# Patient Record
Sex: FEMALE | URBAN - METROPOLITAN AREA
[De-identification: names, ages, dates, MRNs, and addresses within clinical notes are randomized per-mention and may not be internally consistent; named-entity substitution may affect disease eponyms.]

---

## 2017-10-04 ENCOUNTER — OFFICE VISIT (OUTPATIENT)
Dept: OBGYN | Facility: CLINIC | Age: 32
End: 2017-10-04
Payer: COMMERCIAL

## 2017-10-04 VITALS
SYSTOLIC BLOOD PRESSURE: 121 MMHG | DIASTOLIC BLOOD PRESSURE: 73 MMHG | HEART RATE: 76 BPM | WEIGHT: 154.6 LBS | BODY MASS INDEX: 26.4 KG/M2 | HEIGHT: 64 IN

## 2017-10-04 DIAGNOSIS — Z30.09 ENCOUNTER FOR OTHER GENERAL COUNSELING OR ADVICE ON CONTRACEPTION: Primary | ICD-10-CM

## 2017-10-04 DIAGNOSIS — Z11.3 SCREEN FOR STD (SEXUALLY TRANSMITTED DISEASE): ICD-10-CM

## 2017-10-04 PROCEDURE — 99212 OFFICE O/P EST SF 10 MIN: CPT | Mod: ZF

## 2017-10-04 PROCEDURE — 40000809 ZZH STATISTIC NO DOCUMENTATION TO SUPPORT CHARGE

## 2017-10-04 PROCEDURE — 87491 CHLMYD TRACH DNA AMP PROBE: CPT | Performed by: OBSTETRICS & GYNECOLOGY

## 2017-10-04 PROCEDURE — 87591 N.GONORRHOEAE DNA AMP PROB: CPT | Performed by: OBSTETRICS & GYNECOLOGY

## 2017-10-04 RX ORDER — ACETAMINOPHEN AND CODEINE PHOSPHATE 120; 12 MG/5ML; MG/5ML
1 SOLUTION ORAL DAILY
Qty: 84 TABLET | Refills: 1 | Status: SHIPPED | OUTPATIENT
Start: 2017-10-04 | End: 2017-11-17

## 2017-10-04 NOTE — MR AVS SNAPSHOT
After Visit Summary   10/4/2017    Lucie Topete    MRN: [id number]           Patient Information     Date Of Birth          1985        Visit Information        Provider Department      10/4/2017 4:00 PM Maine Lara MD [facility]        Today's Diagnoses     Encounter for other general counseling or advice on contraception    -  1    Screen for STD (sexually transmitted disease)          Care Instructions    Make annual exam appointment in 3 months     If you desire IUD at that time, call ahead of time to get prescription for cytotec/misoprostol      new birth control pill prescription           Follow-ups after your visit        Follow-up notes from your care team     Return in about 3 months (around 1/4/2018) for Physical Exam.      Your next 10 appointments already scheduled     Jan 04, 2018  8:00 AM CST   Return Visit with Laury Man MD   [facility] ([facility])    [facility] [address]  [address]  [facility] [address] [phone]              Who to contact     Please call your clinic at [phone] to:    Ask questions about your health    Make or cancel appointments    Discuss your medicines    Learn about your test results    Speak to your doctor   If you have compliments or concerns about an experience at your clinic, or if you wish to file a complaint, please contact [facility] Patient Relations at [phone] or email us at [email or web address]         Additional Information About Your Visit        MyChart Information     [employer] gives you secure access to your electronic health record. If you see a primary care provider, you can also send messages to your care team and make appointments. If you have questions, please call your primary care clinic.  If you do not have a primary care provider, please call [phone] and they will  "assist you.      [employer] is an electronic gateway that provides easy, online access to your medical records. With [employer], you can request a clinic appointment, read your test results, renew a prescription or communicate with your care team.     To access your existing account, please contact your [facility] or call [phone] for assistance.        Care EveryWhere ID     This is your Care EveryWhere ID. This could be used by other organizations to access your [place] medical records  [id number]        Your Vitals Were     Pulse Height Last Period BMI (Body Mass Index)          76 1.626 m (5' 4\") 09/07/2017 (Exact Date) 26.54 kg/m2         Blood Pressure from Last 3 Encounters:   10/04/17 121/73   10/13/16 122/77   07/25/16 118/74    Weight from Last 3 Encounters:   10/04/17 70.1 kg (154 lb 9.6 oz)   10/13/16 73.4 kg (161 lb 14.4 oz)   07/25/16 75 kg (165 lb 6.4 oz)              We Performed the Following     CHLAMYDIA TRACHOMATIS PCR     NEISSERIA GONORRHOEA PCR          Today's Medication Changes          These changes are accurate as of: 10/4/17 11:59 PM.  If you have any questions, ask your nurse or doctor.               Start taking these medicines.        Dose/Directions    norethindrone 0.35 MG per tablet   Commonly known as:  MICRONOR   Used for:  Encounter for other general counseling or advice on contraception   Started by:  Maine Lara MD        Dose:  1 tablet   Take 1 tablet (0.35 mg) by mouth daily   Quantity:  84 tablet   Refills:  1            Where to get your medicines      These medications were sent to [facility]/pharmacy #[id number] [address] [address]     Phone:  [phone]     norethindrone 0.35 MG per tablet                Primary Care Provider    Physician No Ref-Primary       NO REF-PRIMARY PHYSICIAN        Equal Access to Services     PADMINI HURTADO AH: norbert Dela Cruz " karely nunobillhilaria newtonjourdan macias. So [facility] [phone].    ATENCIÓN: Si lavell ballard, tiene a melendez disposición servicios gratuitos de asistencia lingüística. Llame al [phone].    We comply with applicable federal civil rights laws and [place] laws. We do not discriminate on the basis of race, color, national origin, age, disability, sex, sexual orientation, or gender identity.            Thank you!     Thank you for choosing [facility]  for your care. Our goal is always to provide you with excellent care. Hearing back from our patients is one way we can continue to improve our services. Please take a few minutes to complete the written survey that you may receive in the mail after your visit with us. Thank you!             Your Updated Medication List - Protect others around you: Learn how to safely use, store and throw away your medicines at www.disposemymeds.org.          This list is accurate as of: 10/4/17 11:59 PM.  Always use your most recent med list.                   Brand Name Dispense Instructions for use Diagnosis    norethindrone 0.35 MG per tablet    MICRONOR    84 tablet    Take 1 tablet (0.35 mg) by mouth daily    Encounter for other general counseling or advice on contraception

## 2017-10-04 NOTE — PATIENT INSTRUCTIONS
Make annual exam appointment in 3 months     If you desire IUD at that time, call ahead of time to get prescription for cytotec/misoprostol      new birth control pill prescription

## 2017-10-04 NOTE — PROGRESS NOTES
"S GYN Visit     S: Luice is here today to discuss contraception.  She is in a new relationship and is wanting to become sexually active with him.  She most recently had an IUD in place, however, this was removed in 10/2016 for cramping primarily, but also increased mood changes.  She has been on OCPs in the past and is not sure which method she wants to be on now.  Also, she reports having a bump on her mons which has been present for the past couple of days; she gets waxed and is wondering if this is an ingrown hair.  No concerns about STI, however, is open to GC/Chlam today but declines HIV or trichomonas testing.  Lastly, she reports history of intermittent dyspareunia with certain positions and with initial penetration.  She is wondering if this is normal or what she can do to prevent this in the future.     Past Medical History:   Diagnosis Date     Migraine headache with aura      Seasonal allergies      Uncomplicated asthma       Past Surgical History:   Procedure Laterality Date     BIOPSY      Mole remove - pre-cancerous on breast       No Known Allergies     O:   /73  Pulse 76  Ht 1.626 m (5' 4\")  Wt 70.1 kg (154 lb 9.6 oz)  LMP 2017 (Exact Date)  BMI 26.54 kg/m2     Gen: Pleasant, NAD   CV: Regular rate   Resp: Non-labored breathing   Psych: Good eye contact, bright affect   Neuro: A&O x3    Pelvic:  Normal appearing external female genitalia. Midline about 4 cm above clitoris, is a ~8 mm furuncle with scant amount of purulent drainage. No erythema or evidence of infection.  Otherwise, normal hair distribution. Discomfort     A/P: Lucie Topete is a 32 year old  female who presents for contraception counseling.     Contraception counseling:   - Given history of migraine HA with aura (primarily visual), discussed increased risks with combined options. Thus, focus was primarily on progesterone only options or Paragard IUD.  Patient is thinking about the IUD, however, states it " was a very painful procedure and is hesitant to go through that again.  Rather, she wants to try POP for now.    - Discussed for her to return to clinic in 3 months for annual exam with possible IUD placement pending how POPs go.  Should she desire IUD, will call to have Rx for Misoprostol given.     Mons furuncle:   - Encouraged warm compresses TID PRN     STI screening  - GC/Chlam sent today, notify via MyChart  - Declines HIV     Dyspareunia:   - Evidence of possible myofascial pelvic pain on  exam, with trigger point being right lateral vaginal wall just inside the introitus. Discussed ability to make pelvic floor PT referral, however, patient defers currently.  Will reassess at annual exam.    - Encouraged adequate lubrication    Hank Burns MD   OB/Gyn Resident, PGY-4  October 4, 2017, 4:57 PM     The Patient was seen in [facility] by HANK WYMAN.  I reviewed the history & exam. Assessment and plan were jointly made.    Veronika Charlton MD

## 2017-10-05 LAB
C TRACH DNA SPEC QL NAA+PROBE: NEGATIVE
N GONORRHOEA DNA SPEC QL NAA+PROBE: NEGATIVE
SPECIMEN SOURCE: NORMAL
SPECIMEN SOURCE: NORMAL

## 2017-11-17 ENCOUNTER — MYC MEDICAL ADVICE (OUTPATIENT)
Dept: OBGYN | Facility: CLINIC | Age: 32
End: 2017-11-17

## 2017-11-17 DIAGNOSIS — Z30.09 ENCOUNTER FOR OTHER GENERAL COUNSELING OR ADVICE ON CONTRACEPTION: ICD-10-CM

## 2017-11-17 RX ORDER — ACETAMINOPHEN AND CODEINE PHOSPHATE 120; 12 MG/5ML; MG/5ML
1 SOLUTION ORAL DAILY
Qty: 84 TABLET | Refills: 1 | Status: SHIPPED | OUTPATIENT
Start: 2017-11-17

## 2017-11-21 ENCOUNTER — TELEPHONE (OUTPATIENT)
Dept: OBGYN | Facility: CLINIC | Age: 32
End: 2017-11-21

## 2017-11-21 NOTE — TELEPHONE ENCOUNTER
Pt called to inquire about safety of taking plan B emergency contraception concurrent with norethindrone OCP for added protection. Pt had unprotected intercourse on Monday 11/20. Consulted with Dr. Arredondo who confirms safety of this and informed patient. Pt interested in possible IUD placement and encouraged pt to make appointment to have this placed since she is not trusting her current birth control method.    Pt agreeable to this and will call back to schedule. Pt had no further questions.

## 2017-11-22 ENCOUNTER — TELEPHONE (OUTPATIENT)
Dept: OBGYN | Facility: CLINIC | Age: 32
End: 2017-11-22

## 2017-11-22 NOTE — TELEPHONE ENCOUNTER
Received message on triage voicemail from Lucie stating she dropped her pill for today and couldn't find it so she took the next one in her pill pack. She is wondering how that will affect things when she gets to the end of her pack.    discussed with Dr. Charlton who stated the micronor pills are all the same strength and there are no placebos, so just continuing taking the pills in the pack - will need a refill one day earlier.    Tried to reach Lucie but received voicemail.  Left above message from Dr. Charlton. Call clinic with questions.

## 2020-03-10 ENCOUNTER — HEALTH MAINTENANCE LETTER (OUTPATIENT)
Age: 35
End: 2020-03-10

## 2020-12-20 ENCOUNTER — HEALTH MAINTENANCE LETTER (OUTPATIENT)
Age: 35
End: 2020-12-20

## 2021-04-24 ENCOUNTER — HEALTH MAINTENANCE LETTER (OUTPATIENT)
Age: 36
End: 2021-04-24

## 2021-10-03 ENCOUNTER — HEALTH MAINTENANCE LETTER (OUTPATIENT)
Age: 36
End: 2021-10-03

## 2022-05-15 ENCOUNTER — HEALTH MAINTENANCE LETTER (OUTPATIENT)
Age: 37
End: 2022-05-15